# Patient Record
Sex: FEMALE | Race: WHITE | NOT HISPANIC OR LATINO | URBAN - METROPOLITAN AREA
[De-identification: names, ages, dates, MRNs, and addresses within clinical notes are randomized per-mention and may not be internally consistent; named-entity substitution may affect disease eponyms.]

---

## 2022-10-18 ENCOUNTER — TELEPHONE (OUTPATIENT)
Dept: PODIATRY | Facility: CLINIC | Age: 47
End: 2022-10-18

## 2022-10-18 NOTE — TELEPHONE ENCOUNTER
Caller: Leida Prescott    Doctor: Dr Link Aquino    Reason for call: appt    Call back#: NA/checking chart for date last seen/nothing/NP

## 2022-12-29 ENCOUNTER — OFFICE VISIT (OUTPATIENT)
Dept: PODIATRY | Facility: CLINIC | Age: 47
End: 2022-12-29

## 2022-12-29 VITALS — DIASTOLIC BLOOD PRESSURE: 78 MMHG | HEART RATE: 85 BPM | SYSTOLIC BLOOD PRESSURE: 112 MMHG

## 2022-12-29 DIAGNOSIS — B35.3 TINEA PEDIS OF RIGHT FOOT: ICD-10-CM

## 2022-12-29 DIAGNOSIS — M72.2 PLANTAR FASCIITIS: Primary | ICD-10-CM

## 2022-12-29 RX ORDER — CLOTRIMAZOLE AND BETAMETHASONE DIPROPIONATE 10; .64 MG/G; MG/G
CREAM TOPICAL 2 TIMES DAILY
Qty: 30 G | Refills: 0 | Status: SHIPPED | OUTPATIENT
Start: 2022-12-29

## 2022-12-29 RX ORDER — DOXYCYCLINE HYCLATE 100 MG/1
TABLET, DELAYED RELEASE ORAL
COMMUNITY
Start: 2022-11-06

## 2022-12-29 RX ORDER — METHYLPHENIDATE HYDROCHLORIDE 18 MG/1
TABLET ORAL
COMMUNITY
Start: 2022-12-19

## 2022-12-29 RX ORDER — METHYLPHENIDATE HYDROCHLORIDE 54 MG/1
TABLET ORAL
COMMUNITY
Start: 2022-12-22

## 2022-12-29 RX ORDER — FEXOFENADINE HYDROCHLORIDE 60 MG/1
60 TABLET, FILM COATED ORAL DAILY
COMMUNITY

## 2022-12-29 RX ORDER — TRIAMCINOLONE ACETONIDE 40 MG/ML
20 INJECTION, SUSPENSION INTRA-ARTICULAR; INTRAMUSCULAR ONCE
Status: COMPLETED | OUTPATIENT
Start: 2022-12-29 | End: 2022-12-29

## 2022-12-29 RX ORDER — BUPIVACAINE HYDROCHLORIDE 5 MG/ML
1 INJECTION, SOLUTION EPIDURAL; INTRACAUDAL ONCE
Status: COMPLETED | OUTPATIENT
Start: 2022-12-29 | End: 2022-12-29

## 2022-12-29 RX ORDER — DAPSONE 50 MG/G
GEL TOPICAL 2 TIMES DAILY
COMMUNITY

## 2022-12-29 RX ORDER — LIDOCAINE HYDROCHLORIDE 10 MG/ML
1 INJECTION, SOLUTION EPIDURAL; INFILTRATION; INTRACAUDAL; PERINEURAL ONCE
Status: COMPLETED | OUTPATIENT
Start: 2022-12-29 | End: 2022-12-29

## 2022-12-29 RX ORDER — BUSPIRONE HYDROCHLORIDE 5 MG/1
5 TABLET ORAL
COMMUNITY

## 2022-12-29 RX ORDER — LOSARTAN POTASSIUM 25 MG/1
TABLET ORAL
COMMUNITY
Start: 2022-12-08

## 2022-12-29 RX ADMIN — TRIAMCINOLONE ACETONIDE 20 MG: 40 INJECTION, SUSPENSION INTRA-ARTICULAR; INTRAMUSCULAR at 13:34

## 2022-12-29 RX ADMIN — BUPIVACAINE HYDROCHLORIDE 1 ML: 5 INJECTION, SOLUTION EPIDURAL; INTRACAUDAL at 13:32

## 2022-12-29 RX ADMIN — LIDOCAINE HYDROCHLORIDE 1 ML: 10 INJECTION, SOLUTION EPIDURAL; INFILTRATION; INTRACAUDAL; PERINEURAL at 13:33

## 2022-12-29 NOTE — PROGRESS NOTES
Assessment/Plan:    Explained to patient that her right heel pain is consistent with plantar fasciitis  Discussed treatment options  Urged patient to refrain from walking barefoot  Dispensed heel supports, size 8  Injected right heel with 0 5 cc Kenalog 40 along with 1 cc 1% Xylocaine and 1 cc 0 5% Marcaine  Do not recommend oral NSAIDs at this time  Patient has an allergy to ibuprofen but states that she cannot tolerate Aleve  Stretching exercises recommended  Prescribed Lotrisone cream for twice daily application to right foot  No problem-specific Assessment & Plan notes found for this encounter  Diagnoses and all orders for this visit:    Plantar fasciitis  -     lidocaine (PF) (XYLOCAINE-MPF) 1 % injection 1 mL  -     bupivacaine (PF) (MARCAINE) 0 5 % injection 1 mL  -     triamcinolone acetonide (KENALOG-40) 40 mg/mL injection 20 mg    Tinea pedis of right foot  -     clotrimazole-betamethasone (LOTRISONE) 1-0 05 % cream; Apply topically 2 (two) times a day    Other orders  -     busPIRone (BUSPAR) 5 mg tablet; Take 5 mg by mouth (Patient not taking: Reported on 12/29/2022)  -     fexofenadine (ALLEGRA) 60 MG tablet; Take 60 mg by mouth daily (Patient not taking: Reported on 12/29/2022)  -     losartan (COZAAR) 25 mg tablet; TAKE 1 TABLET (25 MG TOTAL) BY MOUTH DAILY  -     methylphenidate (CONCERTA) 54 MG ER tablet; PLEASE SEE ATTACHED FOR DETAILED DIRECTIONS  -     methylphenidate (CONCERTA) 18 mg ER tablet; TAKE 18MG TWICE A DAY [REGIMEN OF 72 MG IN THE MORNING AND 18 MG IN THE AFTERNOON]  -     doxycycline (DORYX) 100 MG EC tablet  -     Dapsone (Aczone) 5 % topical gel; Apply topically 2 (two) times a day          Subjective:      Patient ID: Cordell Lundborg is a 49 y o  female  HPI     Patient presents with multiple pedal concerns  Her chief complaint involves pain in her right heel  This has been present for approximately 8 months    Pain is present independent of first steps after inactivity  She states that she has pain with every step  No trauma related  Patient has tried heel shock absorbers to no avail  She notes that she is more comfortable if she wears a shoe with a high heel  A second concern involves scaliness present on the bottom of the right foot along with an adjacent callus  Lastly, patient has bilateral bunions  These bunions are painful but patient does not desire surgery due to postoperative morbidity  The following portions of the patient's history were reviewed and updated as appropriate: allergies, current medications, past family history, past medical history, past social history, past surgical history and problem list     Review of Systems   Cardiovascular: Negative  Gastrointestinal: Negative  Musculoskeletal: Negative  Objective:      /78   Pulse 85          Physical Exam  Constitutional:       Appearance: Normal appearance  Cardiovascular:      Pulses: Normal pulses  Musculoskeletal:         General: Tenderness and deformity present  Comments: Pain with palpation medial aspect right heel at fascial insertion to calcaneus  Bilateral hallux valgus deformity  Skin:     General: Skin is warm  Findings: Rash present  Comments: Scaly rash present right forefoot  No interdigital maceration present  Hyperkeratotic lesion at first metatarsal interspace bilateral    Neurological:      General: No focal deficit present  Mental Status: She is oriented to person, place, and time  Foot injection     Date/Time 12/29/2022 2:42 PM     Performed by  Israel Connors DPM     Authorized by Israel Connors DPM      Universal Protocol   Consent: Verbal consent obtained    Risks and benefits: risks, benefits and alternatives were discussed  Consent given by: patient  Patient understanding: patient states understanding of the procedure being performed  Patient identity confirmed: verbally with patient        Local anesthesia used: yes     Anesthesia   Local anesthesia used: yes  Local Anesthetic: lidocaine 1% without epinephrine and bupivacaine 0 5% without epinephrine     Procedure Details   Procedure Notes: Injected right heel with 0 5 cc Kenalog 40 along with 1 cc 1% Xylocaine and 1 cc 0 5% Marcaine

## 2023-02-07 ENCOUNTER — OFFICE VISIT (OUTPATIENT)
Dept: PODIATRY | Facility: CLINIC | Age: 48
End: 2023-02-07

## 2023-02-07 VITALS — SYSTOLIC BLOOD PRESSURE: 132 MMHG | HEART RATE: 69 BPM | DIASTOLIC BLOOD PRESSURE: 81 MMHG

## 2023-02-07 DIAGNOSIS — M79.671 RIGHT FOOT PAIN: ICD-10-CM

## 2023-02-07 DIAGNOSIS — M72.2 PLANTAR FASCIITIS: Primary | ICD-10-CM

## 2023-02-07 RX ORDER — LIDOCAINE HYDROCHLORIDE 10 MG/ML
1 INJECTION, SOLUTION EPIDURAL; INFILTRATION; INTRACAUDAL; PERINEURAL ONCE
Status: COMPLETED | OUTPATIENT
Start: 2023-02-07 | End: 2023-02-07

## 2023-02-07 RX ORDER — BUPIVACAINE HYDROCHLORIDE 5 MG/ML
1 INJECTION, SOLUTION EPIDURAL; INTRACAUDAL ONCE
Status: COMPLETED | OUTPATIENT
Start: 2023-02-07 | End: 2023-02-07

## 2023-02-07 RX ORDER — TRIAMCINOLONE ACETONIDE 40 MG/ML
20 INJECTION, SUSPENSION INTRA-ARTICULAR; INTRAMUSCULAR ONCE
Status: COMPLETED | OUTPATIENT
Start: 2023-02-07 | End: 2023-02-07

## 2023-02-07 RX ADMIN — BUPIVACAINE HYDROCHLORIDE 1 ML: 5 INJECTION, SOLUTION EPIDURAL; INTRACAUDAL at 17:50

## 2023-02-07 RX ADMIN — LIDOCAINE HYDROCHLORIDE 1 ML: 10 INJECTION, SOLUTION EPIDURAL; INFILTRATION; INTRACAUDAL; PERINEURAL at 17:51

## 2023-02-07 RX ADMIN — TRIAMCINOLONE ACETONIDE 20 MG: 40 INJECTION, SUSPENSION INTRA-ARTICULAR; INTRAMUSCULAR at 17:51

## 2023-02-07 NOTE — PROGRESS NOTES
Patient presents for assessment of right heel  Patient had cortisone injection given last visit but it was not successful  She continues to relate pain in the heel  She also relates pain along the medial aspect of the heel  On exam, pain with palpation central aspect right heel at fascial insertion into calcaneus  Tinel's sign is negative for tarsal tunnel syndrome  Treatment: Injected right heel with 0 5 cc Kenalog 40 along with 1 cc 1% Xylocaine and 1 cc 0 5% Marcaine  Patient advised to utilize over-the-counter Aleve 2 tablets daily  Also Voltaren gel along the medial heel  Reappoint 4 weeks  Foot injection     Date/Time 2/7/2023 5:49 PM     Performed by  Nicolasa Quiñonez DPM     Authorized by Nicolasa Quiñonez DPM      Universal Protocol   Consent: Verbal consent obtained  Risks and benefits: risks, benefits and alternatives were discussed  Consent given by: patient  Patient understanding: patient states understanding of the procedure being performed  Patient identity confirmed: verbally with patient        Local anesthesia used: yes     Anesthesia   Local anesthesia used: yes  Local Anesthetic: lidocaine 1% without epinephrine and bupivacaine 0 5% without epinephrine     Procedure Details   Procedure Notes: Injected right heel with 0 5 cc Kenalog 40 along with 1 cc 1% Xylocaine and 1 cc 0 5% Marcaine

## 2023-03-14 ENCOUNTER — OFFICE VISIT (OUTPATIENT)
Dept: PODIATRY | Facility: CLINIC | Age: 48
End: 2023-03-14

## 2023-03-14 VITALS — DIASTOLIC BLOOD PRESSURE: 85 MMHG | HEART RATE: 67 BPM | SYSTOLIC BLOOD PRESSURE: 128 MMHG

## 2023-03-14 DIAGNOSIS — M72.2 PLANTAR FASCIITIS: Primary | ICD-10-CM

## 2023-03-14 DIAGNOSIS — M79.671 RIGHT FOOT PAIN: ICD-10-CM

## 2023-03-14 NOTE — PROGRESS NOTES
Patient presents for assessment of right heel  There has been significant improvement following cortisone injection and the patient taking 2 tablets of Aleve daily  Current pain is minimal   When present it is at the central aspect of the heel  Current pain is not severe enough to recommend cortisone injection  Patient told to continue with 2 tablets of OTC Aleve daily  She will be reassessed in 6 weeks

## 2023-04-25 ENCOUNTER — OFFICE VISIT (OUTPATIENT)
Dept: PODIATRY | Facility: CLINIC | Age: 48
End: 2023-04-25

## 2023-04-25 VITALS — HEART RATE: 80 BPM | SYSTOLIC BLOOD PRESSURE: 137 MMHG | DIASTOLIC BLOOD PRESSURE: 85 MMHG

## 2023-04-25 DIAGNOSIS — M20.11 ACQUIRED HALLUX VALGUS OF RIGHT FOOT: Primary | ICD-10-CM

## 2023-04-25 DIAGNOSIS — M20.12 ACQUIRED HALLUX VALGUS OF LEFT FOOT: ICD-10-CM

## 2023-04-25 RX ORDER — LORAZEPAM 0.5 MG/1
TABLET ORAL
COMMUNITY
Start: 2023-04-10

## 2023-04-25 NOTE — PROGRESS NOTES
Patient presents for assessment of her right heel  Minimal pain now related  Patient is comfortable  However, patient having pain from bunion deformities present both feet  She notes a clicking sound at times when she walks with the right foot bunion  Both bunions are inflamed and patient is having difficulty finding comfortable shoes  Patient is aware that she likely needs foot surgery but has been holding off as she uses karate for her main exercise  I personally reviewed x-rays of the right foot  They reveal a hallux valgus deformity with a small dorsal spur  IM angle is 12 degrees  I personally reviewed x-rays of the left foot  They reveal a hallux valgus deformity  IM angle is 12 degrees  On exam, each bunion appears inflamed  Clinically the bunions look more uncomfortable than the x-ray would imply  There is a good range of motion at each first MPJ with no crepitus  Explained that [facility] bunionectomy's are needed for correction  Patient to consider